# Patient Record
Sex: MALE | Race: BLACK OR AFRICAN AMERICAN | NOT HISPANIC OR LATINO | Employment: OTHER | URBAN - METROPOLITAN AREA
[De-identification: names, ages, dates, MRNs, and addresses within clinical notes are randomized per-mention and may not be internally consistent; named-entity substitution may affect disease eponyms.]

---

## 2022-08-15 ENCOUNTER — TELEPHONE (OUTPATIENT)
Dept: FAMILY MEDICINE | Facility: CLINIC | Age: 37
End: 2022-08-15

## 2022-08-16 NOTE — TELEPHONE ENCOUNTER
Reason for Call:  Same Day Appointment, Requested Provider:  Any    PCP: No Ref-Primary, Physician    Reason for visit: physical - wouldn't elaborate further     Duration of symptoms: n/a     Have you been treated for this in the past? N/a    Additional comments: n/a     Can we leave a detailed message on this number?  YES     Phone number patient can be reached at: [phone]    Best Time: anytime     Call taken on 8/15/2022 at 7:55 PM by John lloyd

## 2022-08-19 NOTE — TELEPHONE ENCOUNTER
LVM for Pt to schedule OV with provider taking new Pt's (Patricia Palmer, Jaya Chavez, Danial, or Dre López).